# Patient Record
Sex: FEMALE | Race: WHITE | NOT HISPANIC OR LATINO | Employment: OTHER | URBAN - METROPOLITAN AREA
[De-identification: names, ages, dates, MRNs, and addresses within clinical notes are randomized per-mention and may not be internally consistent; named-entity substitution may affect disease eponyms.]

---

## 2023-11-17 ENCOUNTER — OFFICE VISIT (OUTPATIENT)
Dept: OBSTETRICS AND GYNECOLOGY | Facility: CLINIC | Age: 43
End: 2023-11-17
Payer: COMMERCIAL

## 2023-11-17 VITALS
SYSTOLIC BLOOD PRESSURE: 124 MMHG | DIASTOLIC BLOOD PRESSURE: 70 MMHG | HEIGHT: 62 IN | WEIGHT: 185 LBS | BODY MASS INDEX: 34.04 KG/M2

## 2023-11-17 DIAGNOSIS — Z01.419 CERVICAL SMEAR, AS PART OF ROUTINE GYNECOLOGICAL EXAMINATION: Primary | ICD-10-CM

## 2023-11-17 DIAGNOSIS — Z01.419 ROUTINE GYNECOLOGICAL EXAMINATION: ICD-10-CM

## 2023-11-17 DIAGNOSIS — Z91.89 INCREASED RISK OF BREAST CANCER: ICD-10-CM

## 2023-11-17 DIAGNOSIS — Z11.51 SPECIAL SCREENING EXAMINATION FOR HUMAN PAPILLOMAVIRUS (HPV): ICD-10-CM

## 2023-11-17 DIAGNOSIS — Z12.31 ENCOUNTER FOR SCREENING MAMMOGRAM FOR MALIGNANT NEOPLASM OF BREAST: ICD-10-CM

## 2023-11-17 DIAGNOSIS — Z11.3 SCREEN FOR STD (SEXUALLY TRANSMITTED DISEASE): ICD-10-CM

## 2023-11-17 DIAGNOSIS — Z12.11 SCREENING FOR COLON CANCER: ICD-10-CM

## 2023-11-17 PROBLEM — D56.1 BETA THALASSEMIA: Status: ACTIVE | Noted: 2023-11-17

## 2023-11-17 LAB
B-HCG UR QL: NEGATIVE
BILIRUB BLD-MCNC: NEGATIVE MG/DL
CLARITY, POC: CLEAR
COLOR UR: YELLOW
EXPIRATION DATE: NORMAL
GLUCOSE UR STRIP-MCNC: NEGATIVE MG/DL
INTERNAL NEGATIVE CONTROL: NORMAL
INTERNAL POSITIVE CONTROL: NORMAL
KETONES UR QL: NEGATIVE
LEUKOCYTE EST, POC: NEGATIVE
Lab: NORMAL
NITRITE UR-MCNC: NEGATIVE MG/ML
PH UR: 5 [PH] (ref 5–8)
PROT UR STRIP-MCNC: NEGATIVE MG/DL
RBC # UR STRIP: NEGATIVE /UL
SP GR UR: 1 (ref 1–1.03)
UROBILINOGEN UR QL: NORMAL

## 2023-11-17 RX ORDER — MELOXICAM 15 MG/1
15 TABLET ORAL DAILY
COMMUNITY
Start: 2023-10-12

## 2023-11-17 NOTE — PROGRESS NOTES
"GYN Annual Exam     CC- Here for annual exam.     Katrin BOYCE is a 43 y.o. female {akrpttype:68864} who presents for annual well woman exam. Periods are {gyn period regularity:715}, lasting {numbers; 0-10:41099} days.     OB History    No obstetric history on file.         Menarche: ***  Current contraception: {contraceptive methods:11689}  History of abnormal Pap smear: {Responses; yes**/no:82761}  History of abnormal mammogram: {yes***/no:04509}  Family history of uterine, colon or ovarian cancer: {family history:75411}  Family history of breast cancer: {family history:99587}  H/o STDs: ***  Last pap:***  Gardasil:{akrgard:14706}  MICHELLE: {MICHELLE:60975}    Health Maintenance   Topic Date Due    Annual Gynecologic Pelvic and Breast Exam  Never done    BMI FOLLOWUP  Never done    COLORECTAL CANCER SCREENING  Never done    COVID-19 Vaccine (1) Never done    INFLUENZA VACCINE  Never done    ANNUAL PHYSICAL  Never done    PAP SMEAR  Never done    TDAP/TD VACCINES (2 - Td or Tdap) 2029    HEPATITIS C SCREENING  Completed    Pneumococcal Vaccine 0-64  Aged Out       No past medical history on file.    No past surgical history on file.      Current Outpatient Medications:     meloxicam (MOBIC) 15 MG tablet, Take 1 tablet by mouth Daily., Disp: , Rfl:     No Known Allergies         No family history on file.    Review of Systems***    /70   Ht 157.5 cm (62\")   Wt 83.9 kg (185 lb)   LMP 11/10/2023   Breastfeeding No   BMI 33.84 kg/m²     Physical Exam***       Assessment/Plan    1) GYN HM: {akrpap:96483}  SBE demonstrated and encouraged.  2) STD screening: {akrSTD:88457} Condoms encouraged.  3) Contraception: {contraceptive methods:86964}  4) Family Planning: {family plannin}, encourage folic acid daily  5) Diet and Exercise discussed  6) Smoking Status: {YES/NO:83271}  7) Social: ***  8) MMG- {akrmm}  9)Follow up prn or 1 year       Diagnoses and all orders for this visit:    1. Cervical " smear, as part of routine gynecological examination (Primary)  -     POC Urinalysis Dipstick  -     POC Pregnancy, Urine  -     IGP, Apt HPV,rfx 16 / 18,45    2. Routine gynecological examination  -     POC Urinalysis Dipstick  -     POC Pregnancy, Urine  -     IGP, Apt HPV,rfx 16 / 18,45    3. Special screening examination for human papillomavirus (HPV)  -     POC Urinalysis Dipstick  -     POC Pregnancy, Urine  -     IGP, Apt HPV,rfx 16 / 18,45          Zully Lambert MD  11/17/2023    09:16 EST

## 2023-11-18 LAB
HBV SURFACE AG SERPL QL IA: NEGATIVE
HCV IGG SERPL QL IA: NON REACTIVE
HIV 1+2 AB+HIV1 P24 AG SERPL QL IA: NON REACTIVE
HSV1 IGG SER IA-ACNC: <0.91 INDEX (ref 0–0.9)
HSV2 IGG SER IA-ACNC: <0.91 INDEX (ref 0–0.9)
RPR SER QL: NON REACTIVE

## 2023-11-18 NOTE — PROGRESS NOTES
GYN Annual Exam     CC- Here for annual exam.     Katrin BOYCE is a 43 y.o. female new patient who presents for annual well woman exam. Periods are regular every 28-30 days, lasting 4 days. She moved from [place] a few years ago. She had an ASCUS neg HPV pap last year and this was her first abnormal pap. She declines Gardasil. Her sister was dx with breast cancer at age 45. She does not know if her genetic testing was abnormal or not. Her daughter has frequent blood clots and she is unaware if there is an abnormal clotting issue or not. Katrin has beta thalasemia.     OB History          2    Para   2    Term   2            AB        Living   2         SAB        IAB        Ectopic        Molar        Multiple        Live Births              Obstetric Comments   2                Menarche: 12  Current contraception: tubal ligation  History of abnormal Pap smear: yes -  ASCUS neg HPV 10/2022  History of abnormal mammogram: no  Family history of uterine, colon or ovarian cancer: yes - p aunt colon age 56  Family history of breast cancer: yes - sister age 45, genetics unknown  H/o STDs: h/o chlamydia  Last pap: 10/2022- ASCUS neg HPV  Gardasil:missed  MICHELLE:  daughter DVT    Health Maintenance   Topic Date Due    Annual Gynecologic Pelvic and Breast Exam  Never done    BMI FOLLOWUP  Never done    COLORECTAL CANCER SCREENING  Never done    COVID-19 Vaccine (1) Never done    INFLUENZA VACCINE  Never done    ANNUAL PHYSICAL  Never done    PAP SMEAR  Never done    TDAP/TD VACCINES (2 - Td or Tdap) 2029    HEPATITIS C SCREENING  Completed    Pneumococcal Vaccine 0-64  Aged Out       Past Medical History:   Diagnosis Date    Anemia     Beta thalassemia     Chlamydia        Past Surgical History:   Procedure Laterality Date    INCISION AND DRAINAGE HEMATOMA      knee    TUBAL ABDOMINAL LIGATION           Current Outpatient Medications:     meloxicam (MOBIC) 15 MG tablet, Take 1 tablet by mouth Daily.,  "Disp: , Rfl:     No Known Allergies    Social History     Tobacco Use    Smoking status: Never    Smokeless tobacco: Never   Vaping Use    Vaping Use: Never used   Substance Use Topics    Alcohol use: Defer    Drug use: Defer       Family History   Problem Relation Age of Onset    Breast cancer Sister     Deep vein thrombosis Daughter     Colon cancer Paternal Aunt     Ovarian cancer Neg Hx     Uterine cancer Neg Hx        Review of Systems   Constitutional:  Negative for appetite change, fatigue, fever and unexpected weight change.   Eyes:  Negative for photophobia and visual disturbance.   Respiratory:  Negative for cough and shortness of breath.    Cardiovascular:  Negative for chest pain and palpitations.   Gastrointestinal:  Negative for abdominal distention, abdominal pain, constipation, diarrhea and nausea.   Endocrine: Negative for cold intolerance and heat intolerance.   Genitourinary:  Negative for dyspareunia, dysuria, menstrual problem, pelvic pain and vaginal discharge.   Musculoskeletal:  Negative for back pain.   Skin:  Negative for color change and rash.   Neurological:  Negative for headaches.   Hematological:  Negative for adenopathy. Does not bruise/bleed easily.   Psychiatric/Behavioral:  Negative for dysphoric mood. The patient is not nervous/anxious.        /70   Ht 157.5 cm (62\")   Wt 83.9 kg (185 lb)   LMP 11/10/2023   Breastfeeding No   BMI 33.84 kg/m²     Physical Exam  Vitals and nursing note reviewed. Exam conducted with a chaperone present.   Constitutional:       Appearance: Normal appearance. She is well-developed. She is obese.   HENT:      Head: Normocephalic and atraumatic.   Eyes:      General: No scleral icterus.     Conjunctiva/sclera: Conjunctivae normal.   Neck:      Thyroid: No thyromegaly.   Cardiovascular:      Rate and Rhythm: Normal rate and regular rhythm.   Pulmonary:      Effort: Pulmonary effort is normal.      Breath sounds: Normal breath sounds.   Chest: "   Breasts:     Right: No swelling, bleeding, inverted nipple, mass, nipple discharge, skin change or tenderness.      Left: No swelling, bleeding, inverted nipple, mass, nipple discharge, skin change or tenderness.   Abdominal:      General: Bowel sounds are normal. There is no distension.      Palpations: Abdomen is soft. There is no mass.      Tenderness: There is no abdominal tenderness. There is no guarding or rebound.      Hernia: No hernia is present.   Genitourinary:     Exam position: Supine.      Labia:         Right: No rash, tenderness or lesion.         Left: No rash, tenderness or lesion.       Urethra: No prolapse, urethral pain, urethral swelling or urethral lesion.      Vagina: No signs of injury and foreign body. No vaginal discharge, erythema, tenderness or bleeding.      Cervix: No cervical motion tenderness, discharge or friability.      Uterus: Not deviated, not enlarged, not fixed and not tender.       Adnexa:         Right: No mass, tenderness or fullness.          Left: No mass, tenderness or fullness.     Musculoskeletal:      Cervical back: Neck supple.   Skin:     General: Skin is warm and dry.   Neurological:      Mental Status: She is alert and oriented to person, place, and time.   Psychiatric:         Behavior: Behavior normal.         Thought Content: Thought content normal.         Judgment: Judgment normal.            Assessment/Plan    1) GYN HM: pap/HPV/C/G/T  SBE demonstrated and encouraged.  2) STD screening: accepts Condoms encouraged.  3) Contraception: tubal ligation  4) Family Planning: family planning: childbearing completed, encourage folic acid daily  5) Diet and Exercise discussed  6) Smoking Status: No  7) Social: no issues  8) MMG- UTD 9/2023 B1. Schedule MMG 9/2024  9)Cscope- refer LG  10) Pt declines Gardasil vaccine  11) Increased risk of breast cancer- refer to [facility]. Pt to clarify her sister's history. Schedule MRI breasts in 3/2024.   12)  I spent > 10  minutes on  the separately reported service of increased risk of breast cancer. This time is not included in the time used to support the annual E/M service also reported today.  13)Follow up prn or 1 year annual        Diagnoses and all orders for this visit:    1. Cervical smear, as part of routine gynecological examination (Primary)  -     POC Urinalysis Dipstick  -     POC Pregnancy, Urine  -     Cancel: IGP, Apt HPV,rfx 16 / 18,45  -     IGP,CtNgTv,Apt HPV,rfx 16 / 18,45  -     Hepatitis B Surface Antigen  -     Hepatitis C Antibody  -     HIV-1 / O / 2 Ag / Antibody  -     HSV 1 & 2 - Specific Antibody, IgG  -     RPR, Rfx Qn RPR / Confirm TP    2. Routine gynecological examination  -     POC Urinalysis Dipstick  -     POC Pregnancy, Urine  -     Cancel: IGP, Apt HPV,rfx 16 / 18,45  -     IGP,CtNgTv,Apt HPV,rfx 16 / 18,45  -     Hepatitis B Surface Antigen  -     Hepatitis C Antibody  -     HIV-1 / O / 2 Ag / Antibody  -     HSV 1 & 2 - Specific Antibody, IgG  -     RPR, Rfx Qn RPR / Confirm TP    3. Special screening examination for human papillomavirus (HPV)  -     POC Urinalysis Dipstick  -     POC Pregnancy, Urine  -     Cancel: IGP, Apt HPV,rfx 16 / 18,45  -     IGP,CtNgTv,Apt HPV,rfx 16 / 18,45  -     Hepatitis B Surface Antigen  -     Hepatitis C Antibody  -     HIV-1 / O / 2 Ag / Antibody  -     HSV 1 & 2 - Specific Antibody, IgG  -     RPR, Rfx Qn RPR / Confirm TP    4. Increased risk of breast cancer  -     MRI Breast Bilateral With & Without Contrast; Future  -     Ambulatory Referral to High Risk Breast ([facility])    5. Screening for colon cancer  -     Ambulatory Referral For Screening Colonoscopy    6. Encounter for screening mammogram for malignant neoplasm of breast  -     Mammo Screening Digital Tomosynthesis Bilateral With CAD; Future    7. Screen for STD (sexually transmitted disease)          Zully Lambert MD  11/17/2023    21:18 EST

## 2023-11-22 LAB
C TRACH RRNA CVX QL NAA+PROBE: NEGATIVE
CYTOLOGIST CVX/VAG CYTO: NORMAL
CYTOLOGY CVX/VAG DOC CYTO: NORMAL
CYTOLOGY CVX/VAG DOC THIN PREP: NORMAL
DX ICD CODE: NORMAL
HIV 1 & 2 AB SER-IMP: NORMAL
HPV GENOTYPE REFLEX: NORMAL
HPV I/H RISK 4 DNA CVX QL PROBE+SIG AMP: NEGATIVE
N GONORRHOEA RRNA CVX QL NAA+PROBE: NEGATIVE
OTHER STN SPEC: NORMAL
STAT OF ADQ CVX/VAG CYTO-IMP: NORMAL
T VAGINALIS RRNA SPEC QL NAA+PROBE: NEGATIVE

## 2024-01-31 ENCOUNTER — OFFICE VISIT (OUTPATIENT)
Dept: MAMMOGRAPHY | Facility: CLINIC | Age: 44
End: 2024-01-31
Payer: COMMERCIAL

## 2024-01-31 VITALS
SYSTOLIC BLOOD PRESSURE: 118 MMHG | DIASTOLIC BLOOD PRESSURE: 76 MMHG | HEART RATE: 94 BPM | HEIGHT: 62 IN | BODY MASS INDEX: 34.04 KG/M2 | OXYGEN SATURATION: 99 % | WEIGHT: 185 LBS

## 2024-01-31 DIAGNOSIS — Z91.89 INCREASED RISK OF BREAST CANCER: Primary | ICD-10-CM

## 2024-01-31 NOTE — LETTER
January 31, 2024     Zully Lambert MD  [address]  [facility] [address]  [place] [address]    Patient: Katrin BOYCE   YOB: 1980   Date of Visit: 1/31/2024     Dear Zully Lambert MD:       Thank you for referring Katrin BOYCE to me for evaluation. Below are the relevant portions of my assessment and plan of care.    Assessment:  1. Increased risk of breast cancer    The patient is aware that high risk breast cancer patients are typically identified because of a strong family history for breast cancer, a genetic mutation, LCIS, atypical hyperplasia, or history of radiation to the chest wall under the age of 30.  Our risk assessment models of estimated her lifetime risk between 16 and 19%.  Her 5-year risk is estimated at 1.4%.  Based on these findings, the patient is at intermediate risk for breast cancer.  I would not encourage her to have MRI imaging at this point in time.  I would recommend yearly 3D mammography and twice yearly clinical breast examination.  I would like to alternate those with Dr. Lambert.  Certainly a biopsy or a new case of breast cancer in the family may push her above 20% lifetime risk.    The patient is also aware of the benefits of exercise, maintaining ideal body weight, and limiting alcohol intake.      Plan:  1.  Her next mammograms are due in September 2024.  2.  I would like to see her back in the office in a little over a year so that my visits are  by 6 months from her visits with Dr. Lambert.    If you have questions, please do not hesitate to call me. I look forward to following Katrin along with you.         Sincerely,        Cecil Ford MD        CC: MD Logan Ram, Cecil COUCH MD  01/31/24 1332  Sign when Signing Visit  Chief Complaint: Katrin BOYCE is a 43 y.o.. female here today for FHx Breast Cancer        History of Present Illness:  Patient presents with management of breast cancer risk.    She is a nice 43-year-old white female whose sister was diagnosed with breast cancer at the age of 45.  This was 2022 and she did undergo genetic testing which was negative.  There is no other breast cancer in the family.  There is a paternal aunt who had colon cancer.    The patient's last mammograms were performed in September 2023.  I personally reviewed those imaging studies along with the reports.  She does have heterogeneously dense breast tissue but I do not see any dominant masses, worrisome microcalcifications, or areas of architectural distortion.  The lymph node regions also look fine.    The patient has not palpated anything of concern.    Review of Systems:  Review of Systems   All other systems reviewed and are negative.     I have reviewed the ROS as documented by the MA/LPN/RN Cecil Ford MD      Past Medical and Surgical History:  Breast Biopsy History:  Patient has not had a breast biopsy in the past.  Breast Cancer HIstory:  Patient does not have a past medical history of breast cancer.  Breast Operations, and year:  0  Social History     Tobacco Use   Smoking Status Never   • Passive exposure: Never   Smokeless Tobacco Never     Obstetric History:  Patient is premenopausal, first day of last period: 1/28/2024  Number of pregnancies:2  Number of live births: 2  Number of abortions or miscarriages: 0  Age of delivery of first child: 18  Patient breast fed, for the following lenth of time: 15 months  Length of time taking birth control pills:3  Patient has never taken hormone replacement    Past Surgical History:   Procedure Laterality Date   • INCISION AND DRAINAGE HEMATOMA      knee   • TUBAL ABDOMINAL LIGATION         Past Medical History:   Diagnosis Date   • Anemia    • Beta thalassemia    • Chlamydia        Prior Hospitalizations, other than for surgery or childbirth, and year:  0    Social History:  Patient is .  Patient has 1 daughters. and Patient has 1 sons.    Family  History:  Family History   Problem Relation Age of Onset   • Breast cancer Sister 45   • Deep vein thrombosis Daughter    • Colon cancer Paternal Aunt    • Ovarian cancer Neg Hx    • Uterine cancer Neg Hx        Vital Signs:  Vitals:    01/31/24 1244   BP: 118/76   Pulse: 94   SpO2: 99%       Medications:    Current Outpatient Prescriptions:   No current outpatient medications on file.    Physical Examination:  General Appearance:   Patient is in no distress.  She is well kept and has a BMI of 33.8.  Psychiatric:  Patient with appropriate mood and affect. Alert and oriented to self, time, and place.    Breast, RIGHT:  medium sized, symmetric with the contralateral side.  Breast skin is without erythema, edema, rashes.  There are no visible abnormalities upon inspection during the arm-raising maneuver or with hands on hips in the sitting position. There is no nipple retraction, discharge or nipple/areolar skin changes.There are no masses palpable in the sitting or supine positions.    Breast, LEFT:  medium sized, symmetric with the contralateral side.  Breast skin is without erythema, edema, rashes.  There are no visible abnormalities upon inspection during the arm-raising maneuver or with hands on hips in the sitting position. There is no nipple retraction, discharge or nipple/areolar skin changes.There are no masses palpable in the sitting or supine positions.    Lymphatic:  There is no axillary, cervical, infraclavicular, or supraclavicular adenopathy bilaterally.    Abdomen is soft, nondistended, and nontender.  There was no obvious hepatosplenomegaly or abdominal mass.  There is a small umbilical scar from her tubal ligation.    Musculoskeletal:  Good strength in all 4 extremities.   There is good range of motion in both shoulders.      Assessment:  1. Increased risk of breast cancer    The patient is aware that high risk breast cancer patients are typically identified because of a strong family history for  breast cancer, a genetic mutation, LCIS, atypical hyperplasia, or history of radiation to the chest wall under the age of 30.  Our risk assessment models of estimated her lifetime risk between 16 and 19%.  Her 5-year risk is estimated at 1.4%.  Based on these findings, the patient is at intermediate risk for breast cancer.  I would not encourage her to have MRI imaging at this point in time.  I would recommend yearly 3D mammography and twice yearly clinical breast examination.  I would like to alternate those with Dr. Lambert.  Certainly a biopsy or a new case of breast cancer in the family may push her above 20% lifetime risk.    The patient is also aware of the benefits of exercise, maintaining ideal body weight, and limiting alcohol intake.      Plan:  1.  Her next mammograms are due in September 2024.  2.  I would like to see her back in the office in a little over a year so that my visits are  by 6 months from her visits with Dr. Lambert.      CPT coding:    Next Appointment:  No follow-ups on file.            EMR Dragon/transcription disclaimer:    Much of this encounter note is an electronic transcription/translocation of spoken language to printed text.  The electronic translation of spoken language may permit erroneous, or at times, nonsensical words or phrases to be inadvertently transcribed.  Although I have reviewed the note from such areas, some may still exist.

## 2024-01-31 NOTE — PROGRESS NOTES
Chief Complaint: Katrin BOYCE is a 43 y.o.. female here today for FHx Breast Cancer        History of Present Illness:  Patient presents with management of breast cancer risk.   She is a nice 43-year-old white female whose sister was diagnosed with breast cancer at the age of 45.  This was 2022 and she did undergo genetic testing which was negative.  There is no other breast cancer in the family.  There is a paternal aunt who had colon cancer.    The patient's last mammograms were performed in September 2023.  I personally reviewed those imaging studies along with the reports.  She does have heterogeneously dense breast tissue but I do not see any dominant masses, worrisome microcalcifications, or areas of architectural distortion.  The lymph node regions also look fine.    The patient has not palpated anything of concern.    Review of Systems:  Review of Systems   All other systems reviewed and are negative.     I have reviewed the ROS as documented by the MA/LPN/RN Cecil Ford MD      Past Medical and Surgical History:  Breast Biopsy History:  Patient has not had a breast biopsy in the past.  Breast Cancer HIstory:  Patient does not have a past medical history of breast cancer.  Breast Operations, and year:  0  Social History     Tobacco Use   Smoking Status Never    Passive exposure: Never   Smokeless Tobacco Never     Obstetric History:  Patient is premenopausal, first day of last period: 1/28/2024  Number of pregnancies:2  Number of live births: 2  Number of abortions or miscarriages: 0  Age of delivery of first child: 18  Patient breast fed, for the following lenth of time: 15 months  Length of time taking birth control pills:3  Patient has never taken hormone replacement    Past Surgical History:   Procedure Laterality Date    INCISION AND DRAINAGE HEMATOMA      knee    TUBAL ABDOMINAL LIGATION         Past Medical History:   Diagnosis Date    Anemia     Beta thalassemia     Chlamydia        Prior  Hospitalizations, other than for surgery or childbirth, and year:  0    Social History:  Patient is .  Patient has 1 daughters. and Patient has 1 sons.    Family History:  Family History   Problem Relation Age of Onset    Breast cancer Sister 45    Deep vein thrombosis Daughter     Colon cancer Paternal Aunt     Ovarian cancer Neg Hx     Uterine cancer Neg Hx        Vital Signs:  Vitals:    01/31/24 1244   BP: 118/76   Pulse: 94   SpO2: 99%       Medications:    Current Outpatient Prescriptions:   No current outpatient medications on file.    Physical Examination:  General Appearance:   Patient is in no distress.  She is well kept and has a BMI of 33.8.  Psychiatric:  Patient with appropriate mood and affect. Alert and oriented to self, time, and place.    Breast, RIGHT:  medium sized, symmetric with the contralateral side.  Breast skin is without erythema, edema, rashes.  There are no visible abnormalities upon inspection during the arm-raising maneuver or with hands on hips in the sitting position. There is no nipple retraction, discharge or nipple/areolar skin changes.There are no masses palpable in the sitting or supine positions.    Breast, LEFT:  medium sized, symmetric with the contralateral side.  Breast skin is without erythema, edema, rashes.  There are no visible abnormalities upon inspection during the arm-raising maneuver or with hands on hips in the sitting position. There is no nipple retraction, discharge or nipple/areolar skin changes.There are no masses palpable in the sitting or supine positions.    Lymphatic:  There is no axillary, cervical, infraclavicular, or supraclavicular adenopathy bilaterally.    Abdomen is soft, nondistended, and nontender.  There was no obvious hepatosplenomegaly or abdominal mass.  There is a small umbilical scar from her tubal ligation.    Musculoskeletal:  Good strength in all 4 extremities.   There is good range of motion in both  shoulders.      Assessment:  1. Increased risk of breast cancer    The patient is aware that high risk breast cancer patients are typically identified because of a strong family history for breast cancer, a genetic mutation, LCIS, atypical hyperplasia, or history of radiation to the chest wall under the age of 30.  Our risk assessment models of estimated her lifetime risk between 16 and 19%.  Her 5-year risk is estimated at 1.4%.  Based on these findings, the patient is at intermediate risk for breast cancer.  I would not encourage her to have MRI imaging at this point in time.  I would recommend yearly 3D mammography and twice yearly clinical breast examination.  I would like to alternate those with Dr. Lambert.  Certainly a biopsy or a new case of breast cancer in the family may push her above 20% lifetime risk.    The patient is also aware of the benefits of exercise, maintaining ideal body weight, and limiting alcohol intake.      Plan:  1.  Her next mammograms are due in September 2024.  2.  I would like to see her back in the office in a little over a year so that my visits are  by 6 months from her visits with Dr. Lambert.      CPT coding:    Next Appointment:  No follow-ups on file.            EMR Dragon/transcription disclaimer:    Much of this encounter note is an electronic transcription/translocation of spoken language to printed text.  The electronic translation of spoken language may permit erroneous, or at times, nonsensical words or phrases to be inadvertently transcribed.  Although I have reviewed the note from such areas, some may still exist.

## 2024-04-03 ENCOUNTER — TELEPHONE (OUTPATIENT)
Dept: OBSTETRICS AND GYNECOLOGY | Facility: CLINIC | Age: 44
End: 2024-04-03
Payer: COMMERCIAL

## 2024-04-03 NOTE — TELEPHONE ENCOUNTER
Patients Breast MRI came back up in my WQ, called patient and she stated she had seen breast specialist and thought she did not have to it? Can you review visit from 1/31 and let me know if I can cancel the MRI?

## 2025-04-07 ENCOUNTER — OFFICE VISIT (OUTPATIENT)
Dept: OBSTETRICS AND GYNECOLOGY | Facility: CLINIC | Age: 45
End: 2025-04-07
Payer: COMMERCIAL

## 2025-04-07 VITALS
WEIGHT: 162.8 LBS | SYSTOLIC BLOOD PRESSURE: 110 MMHG | DIASTOLIC BLOOD PRESSURE: 70 MMHG | HEIGHT: 62 IN | BODY MASS INDEX: 29.96 KG/M2

## 2025-04-07 DIAGNOSIS — Z12.31 ENCOUNTER FOR SCREENING MAMMOGRAM FOR MALIGNANT NEOPLASM OF BREAST: ICD-10-CM

## 2025-04-07 DIAGNOSIS — Z01.419 ROUTINE GYNECOLOGICAL EXAMINATION: ICD-10-CM

## 2025-04-07 DIAGNOSIS — Z12.11 SCREENING FOR COLON CANCER: ICD-10-CM

## 2025-04-07 DIAGNOSIS — Z11.51 SPECIAL SCREENING EXAMINATION FOR HUMAN PAPILLOMAVIRUS (HPV): ICD-10-CM

## 2025-04-07 DIAGNOSIS — Z01.419 CERVICAL SMEAR, AS PART OF ROUTINE GYNECOLOGICAL EXAMINATION: Primary | ICD-10-CM

## 2025-04-07 NOTE — PROGRESS NOTES
GYN Annual Exam     CC- Here for annual exam.     Katrin BOYCE is a 44 y.o. female est pt  who presents for annual well woman exam. She was last seen in 2023.  Periods are regular every 28-30 days, lasting 4 days.  Her sister was dx with breast cancer at age 45.  She saw [facility] in  and her TC= 16-19 % at that time. She is past due for Cscope but wants to do Cologuard instead. We discussed that she is not low risk and she voices understanding.     OB History          2    Para   2    Term   2            AB        Living   2         SAB        IAB        Ectopic        Molar        Multiple        Live Births              Obstetric Comments   2                Menarche: 12  Current contraception: tubal ligation  History of abnormal Pap smear: yes -  ASCUS neg HPV 10/2022  History of abnormal mammogram: no  Family history of uterine, colon or ovarian cancer: yes - p aunt colon age 56  Family history of breast cancer: yes - sister age 45, genetics neg, TC= 16-19%  H/o STDs: h/o chlamydia  Last pap:2023- nl pap/HPV  Gardasil:missed  MICHELLE:  daughter DVT  B thalesemia    Health Maintenance   Topic Date Due    Pneumococcal Vaccine 0-49 (1 of 2 - PCV) Never done    COLORECTAL CANCER SCREENING  Never done    ANNUAL PHYSICAL  Never done    COVID-19 Vaccine ( - - season) Never done    Annual Gynecologic Pelvic and Breast Exam  2024    INFLUENZA VACCINE  2025    MAMMOGRAM  2025    PAP SMEAR  2026    TDAP/TD VACCINES (2 - Td or Tdap) 2029    HEPATITIS C SCREENING  Completed       Past Medical History:   Diagnosis Date    Anemia     Beta thalassemia     Chlamydia        Past Surgical History:   Procedure Laterality Date    INCISION AND DRAINAGE HEMATOMA      knee    TUBAL ABDOMINAL LIGATION         No current outpatient medications on file.    No Known Allergies    Social History     Tobacco Use    Smoking status: Never     Passive exposure: Never    Smokeless  "tobacco: Never   Vaping Use    Vaping status: Never Used   Substance Use Topics    Alcohol use: Defer    Drug use: Defer       Family History   Problem Relation Age of Onset    Breast cancer Sister 45    Deep vein thrombosis Daughter     Colon cancer Paternal Aunt     Ovarian cancer Neg Hx     Uterine cancer Neg Hx        Review of Systems   Constitutional:  Negative for appetite change, fatigue, fever and unexpected weight change.   Eyes:  Negative for photophobia and visual disturbance.   Respiratory:  Negative for cough and shortness of breath.    Cardiovascular:  Negative for chest pain and palpitations.   Gastrointestinal:  Negative for abdominal distention, abdominal pain, constipation, diarrhea and nausea.   Endocrine: Negative for cold intolerance and heat intolerance.   Genitourinary:  Negative for dyspareunia, dysuria, menstrual problem, pelvic pain and vaginal discharge.   Musculoskeletal:  Negative for back pain.   Skin:  Negative for color change and rash.   Neurological:  Negative for headaches.   Hematological:  Negative for adenopathy. Does not bruise/bleed easily.   Psychiatric/Behavioral:  Negative for dysphoric mood. The patient is not nervous/anxious.        /70   Ht 157.5 cm (62.01\")   Wt 73.8 kg (162 lb 12.8 oz)   LMP  (LMP Unknown) Comment: about a month ago  Breastfeeding No   BMI 29.77 kg/m²     Physical Exam  Vitals and nursing note reviewed. Exam conducted with a chaperone present.   Constitutional:       Appearance: Normal appearance. She is well-developed. She is obese.   HENT:      Head: Normocephalic and atraumatic.   Eyes:      General: No scleral icterus.     Conjunctiva/sclera: Conjunctivae normal.   Neck:      Thyroid: No thyromegaly.   Cardiovascular:      Rate and Rhythm: Normal rate and regular rhythm.   Pulmonary:      Effort: Pulmonary effort is normal.      Breath sounds: Normal breath sounds.   Chest:   Breasts:     Right: No swelling, bleeding, inverted nipple, " "mass, nipple discharge, skin change or tenderness.      Left: No swelling, bleeding, inverted nipple, mass, nipple discharge, skin change or tenderness.   Abdominal:      General: Bowel sounds are normal. There is no distension.      Palpations: Abdomen is soft. There is no mass.      Tenderness: There is no abdominal tenderness. There is no guarding or rebound.      Hernia: No hernia is present.   Genitourinary:     Exam position: Supine.      Labia:         Right: No rash, tenderness or lesion.         Left: No rash, tenderness or lesion.       Urethra: No prolapse, urethral pain, urethral swelling or urethral lesion.      Vagina: No signs of injury and foreign body. No vaginal discharge, erythema, tenderness or bleeding.      Cervix: No cervical motion tenderness, discharge or friability.      Uterus: Not deviated, not enlarged, not fixed and not tender.       Adnexa:         Right: No mass, tenderness or fullness.          Left: No mass, tenderness or fullness.     Musculoskeletal:      Cervical back: Neck supple.   Skin:     General: Skin is warm and dry.   Neurological:      Mental Status: She is alert and oriented to person, place, and time.   Psychiatric:         Behavior: Behavior normal.         Thought Content: Thought content normal.         Judgment: Judgment normal.            Assessment/Plan    1) GYN HM: check pap/HPV  SBE demonstrated and encouraged.  2) STD screening: declines Condoms encouraged.  3) Contraception: tubal ligation  4) Family Planning: family planning: childbearing completed, encourage folic acid daily  5) Diet and Exercise discussed  6) Smoking Status: No  7) Social: no issues  8) MMG- UTD 9/2023 B1. Schedule MMG now  9)Cscope- pt declines Cscope, plans Cologuard. Pt aware that tests are not equivalent in the detection of colon cancer and that she is not \"low risk\" based on her family history.   10)Parts of this document have been copied or forwarded from her previous visits and " have been reviewed, updated and edited as indicated.   11) Follow up prn or 1 year annual        Diagnoses and all orders for this visit:    1. Cervical smear, as part of routine gynecological examination (Primary)  -     POC Urinalysis Dipstick  -     POC Pregnancy, Urine  -     IGP, Apt HPV,rfx 16 / 18,45    2. Routine gynecological examination  -     POC Urinalysis Dipstick  -     POC Pregnancy, Urine  -     IGP, Apt HPV,rfx 16 / 18,45    3. Special screening examination for human papillomavirus (HPV)  -     POC Urinalysis Dipstick  -     POC Pregnancy, Urine  -     IGP, Apt HPV,rfx 16 / 18,45    4. Encounter for screening mammogram for malignant neoplasm of breast  -     Mammo Screening Digital Tomosynthesis Bilateral With CAD; Future    5. Screening for colon cancer  -     Cologuard - Stool, Per Rectum; Future          Zully Lambert MD  4/7/2025    16:00 EDT

## 2025-04-09 LAB
CYTOLOGIST CVX/VAG CYTO: NORMAL
CYTOLOGY CVX/VAG DOC CYTO: NORMAL
CYTOLOGY CVX/VAG DOC THIN PREP: NORMAL
DX ICD CODE: NORMAL
HPV I/H RISK 4 DNA CVX QL PROBE+SIG AMP: NEGATIVE
OTHER STN SPEC: NORMAL
SERVICE CMNT-IMP: NORMAL
STAT OF ADQ CVX/VAG CYTO-IMP: NORMAL

## 2025-05-08 ENCOUNTER — PREP FOR SURGERY (OUTPATIENT)
Dept: SURGERY | Facility: SURGERY CENTER | Age: 45
End: 2025-05-08
Payer: COMMERCIAL

## 2025-05-08 DIAGNOSIS — Z12.11 ENCOUNTER FOR SCREENING COLONOSCOPY: ICD-10-CM

## 2025-05-08 DIAGNOSIS — Z80.0 FAMILY HISTORY OF COLON CANCER: ICD-10-CM

## 2025-05-08 DIAGNOSIS — R19.5 POSITIVE COLORECTAL CANCER SCREENING USING COLOGUARD TEST: Primary | ICD-10-CM

## 2025-05-08 RX ORDER — SODIUM CHLORIDE, SODIUM LACTATE, POTASSIUM CHLORIDE, CALCIUM CHLORIDE 600; 310; 30; 20 MG/100ML; MG/100ML; MG/100ML; MG/100ML
30 INJECTION, SOLUTION INTRAVENOUS CONTINUOUS PRN
OUTPATIENT
Start: 2025-05-08 | End: 2025-05-09

## 2025-05-08 RX ORDER — SODIUM CHLORIDE 0.9 % (FLUSH) 0.9 %
10 SYRINGE (ML) INJECTION AS NEEDED
OUTPATIENT
Start: 2025-05-08

## 2025-05-08 RX ORDER — SODIUM CHLORIDE 0.9 % (FLUSH) 0.9 %
3 SYRINGE (ML) INJECTION EVERY 12 HOURS SCHEDULED
OUTPATIENT
Start: 2025-05-08

## 2025-06-04 ENCOUNTER — HOSPITAL ENCOUNTER (OUTPATIENT)
Dept: MAMMOGRAPHY | Facility: HOSPITAL | Age: 45
Discharge: HOME | End: 2025-06-04
Admitting: OBSTETRICS & GYNECOLOGY
Payer: COMMERCIAL

## 2025-06-04 DIAGNOSIS — Z12.31 ENCOUNTER FOR SCREENING MAMMOGRAM FOR MALIGNANT NEOPLASM OF BREAST: ICD-10-CM

## 2025-06-04 PROCEDURE — 77063 BREAST TOMOSYNTHESIS BI: CPT

## 2025-06-04 PROCEDURE — 77067 SCR MAMMO BI INCL CAD: CPT | Performed by: RADIOLOGY

## 2025-06-04 PROCEDURE — 77063 BREAST TOMOSYNTHESIS BI: CPT | Performed by: RADIOLOGY

## 2025-06-04 PROCEDURE — 77067 SCR MAMMO BI INCL CAD: CPT
